# Patient Record
Sex: FEMALE | URBAN - METROPOLITAN AREA
[De-identification: names, ages, dates, MRNs, and addresses within clinical notes are randomized per-mention and may not be internally consistent; named-entity substitution may affect disease eponyms.]

---

## 2021-04-06 ENCOUNTER — HOSPITAL ENCOUNTER (OUTPATIENT)
Facility: CLINIC | Age: 21
Discharge: HOME | End: 2021-04-06
Attending: OBSTETRICS & GYNECOLOGY | Admitting: OBSTETRICS & GYNECOLOGY
Payer: COMMERCIAL

## 2021-04-06 ENCOUNTER — HOSPITAL ENCOUNTER (OUTPATIENT)
Facility: CLINIC | Age: 21
End: 2021-04-06
Admitting: OBSTETRICS & GYNECOLOGY
Payer: COMMERCIAL

## 2021-04-06 VITALS — DIASTOLIC BLOOD PRESSURE: 67 MMHG | TEMPERATURE: 98 F | SYSTOLIC BLOOD PRESSURE: 109 MMHG | RESPIRATION RATE: 16 BRPM

## 2021-04-06 DIAGNOSIS — Z36.89 ENCOUNTER FOR TRIAGE IN PREGNANT PATIENT: Primary | ICD-10-CM

## 2021-04-06 LAB
ALBUMIN UR-MCNC: NEGATIVE MG/DL
APPEARANCE UR: CLEAR
BILIRUB UR QL STRIP: NEGATIVE
COLOR UR AUTO: ABNORMAL
GLUCOSE UR STRIP-MCNC: NEGATIVE MG/DL
HGB UR QL STRIP: NEGATIVE
KETONES UR STRIP-MCNC: 10 MG/DL
LEUKOCYTE ESTERASE UR QL STRIP: NEGATIVE
NITRATE UR QL: NEGATIVE
PH UR STRIP: 7 PH (ref 5–7)
RBC #/AREA URNS AUTO: <1 /HPF (ref 0–2)
SOURCE: ABNORMAL
SP GR UR STRIP: 1.01 (ref 1–1.03)
SQUAMOUS #/AREA URNS AUTO: <1 /HPF (ref 0–1)
UROBILINOGEN UR STRIP-MCNC: NORMAL MG/DL (ref 0–2)
WBC #/AREA URNS AUTO: 1 /HPF (ref 0–5)

## 2021-04-06 PROCEDURE — G0463 HOSPITAL OUTPT CLINIC VISIT: HCPCS

## 2021-04-06 PROCEDURE — 81001 URINALYSIS AUTO W/SCOPE: CPT | Performed by: OBSTETRICS & GYNECOLOGY

## 2021-04-06 RX ORDER — PRENATAL VIT/IRON FUM/FOLIC AC 27MG-0.8MG
1 TABLET ORAL DAILY
COMMUNITY

## 2021-04-06 NOTE — PROVIDER NOTIFICATION
21 0204   Provider Notification   Provider Name/Title Dr. Rose   Method of Notification Phone   Request Evaluate - Remote   Notification Reason Patient Arrived     MD notified of patient arrival.  at 32&4 weeks here for rule out  labor. Patient states feeling lower abdominal cramping beginning around 11pm, and she noticed a pink tinged discharge on her toilet paper when she wiped. She also reports feeling lower back pain and vaginal pressure. Patient denies LOF, change in vaginal discharge, UTI symptoms, but reports having intercourse in the past couple hours. FHR moderate & contractions traced about every 2-3 minutes, palpating mild. Verbal orders from MD to check her cervix, and to send a UA. If significantly dilated, do a GBS swab.

## 2021-04-06 NOTE — DISCHARGE INSTRUCTIONS
Discharge Instruction for Undelivered Patients      You were seen for: Labor Assessment  We Consulted:   You had (Test or Medicine):fetal monitoring, contraction monitoring, cervical examinations and urine test     Diet:   Drink 8 to 12 glasses of liquids (milk, juice, water) every day.  You may eat meals and snacks.     Activity:  Count fetal kicks everyday (see handout)  Call your doctor or nurse midwife if your baby is moving less than usual.     Call your provider if you notice:  Swelling in your face or increased swelling in your hands or legs.  Headaches that are not relieved by Tylenol (acetaminophen).  Changes in your vision (blurring: seeing spots or stars.)  Nausea (sick to your stomach) and vomiting (throwing up).   Weight gain of 5 pounds or more per week.  Heartburn that doesn't go away.  Signs of bladder infection: pain when you urinate (use the toilet), need to go more often and more urgently.  The bag of beard (rupture of membranes) breaks, or you notice leaking in your underwear.  Bright red blood in your underwear.  Abdominal (lower belly) or stomach pain.  *If less than 34 weeks: Contractions (tightenings) more than 6 times in one hour.  Increase or change in vaginal discharge (note the color and amount)  Notify your doctor with any further questions or concerns.    Follow-up:  As scheduled in the clinic

## 2021-04-06 NOTE — PROVIDER NOTIFICATION
04/06/21 0319   Provider Notification   Provider Name/Title    Method of Notification Phone   Request Evaluate - Remote   Notification Reason Uterine Activity;Pain;Lab/Diagnostic Study;Patient Request;SVE    called with status update. Cervical exam unchanged. Patient describes uterine cramping as being a little stronger then menstrual cramps, rating pain 5/10 and declined any pain medication. FHT category 1 tracing. UA results reviewed. Patient is requesting to go home. Patient has follow up appointment this morning at 0900 with . TORB by  to discharge home and follow up with scheduled appointment. POC discussed with patient and FOB.

## 2021-04-06 NOTE — PROGRESS NOTES
Data: Patient assessed in the [facility] for uterine contractions.  Cervical exam mid position, dilated to 1-2 and effaced 50-70%.  Membranes intact.  Contractions irregular, patient describes uterine cramps as being a little stronger then menstrual cramps.  Action:  Presumed adequate fetal oxygenation documented (see flow record). Discharge instructions reviewed.  Patient instructed to report change in fetal movement, vaginal leaking of fluid or bleeding, abdominal pain, or any concerns related to the pregnancy to her nurse/physician.    Response: Orders to discharge home per Tamera Rose.  Patient verbalized understanding of education and verbalized agreement with plan. Discharged to home at 0345 via ambulation with FOB.

## 2021-04-06 NOTE — PLAN OF CARE
Data: [facility] presented to [facility]: 2021  1:29 AM.  Reason for maternal/fetal assessment is uterine contractions. Patient reports feeling cramping beginning at 11pm and noting a small amount of pink discharge when wiping.  Patient is a .  Prenatal record reviewed. Pregnancy  has been complicated by history of drug use, anxiety and gonorrhea in pregnancy.  Gestational Age 32w4d. VSS. Fetal movement present. Patient denies leaking of vaginal fluid/rupture of membranes, abdominal pain, nausea, vomiting, headache, visual disturbances, epigastric or URQ pain, significant edema. Support person is present.   Action: Verbal consent for EFM. Triage assessment completed. Bill of rights reviewed.  Response: Patient verbalized agreement with plan. Will contact Dr Rose  with update and further orders.

## 2021-08-13 ENCOUNTER — LAB REQUISITION (OUTPATIENT)
Dept: LAB | Facility: CLINIC | Age: 21
End: 2021-08-13

## 2021-08-13 LAB
HBV SURFACE AB SERPL IA-ACNC: 687.95 M[IU]/ML
HBV SURFACE AG SERPL QL IA: NONREACTIVE

## 2021-08-13 PROCEDURE — 87340 HEPATITIS B SURFACE AG IA: CPT | Performed by: INTERNAL MEDICINE

## 2021-08-13 PROCEDURE — 86706 HEP B SURFACE ANTIBODY: CPT | Performed by: INTERNAL MEDICINE

## 2021-08-13 PROCEDURE — 86481 TB AG RESPONSE T-CELL SUSP: CPT | Performed by: INTERNAL MEDICINE

## 2021-08-15 LAB
GAMMA INTERFERON BACKGROUND BLD IA-ACNC: 0.06 IU/ML
M TB IFN-G BLD-IMP: NEGATIVE
M TB IFN-G CD4+ BCKGRND COR BLD-ACNC: 9.94 IU/ML
MITOGEN IGNF BCKGRD COR BLD-ACNC: 0.01 IU/ML
MITOGEN IGNF BCKGRD COR BLD-ACNC: 0.03 IU/ML
QUANTIFERON MITOGEN: 10 IU/ML
QUANTIFERON NIL TUBE: 0.06 IU/ML
QUANTIFERON TB1 TUBE: 0.07 IU/ML
QUANTIFERON TB2 TUBE: 0.09